# Patient Record
Sex: FEMALE | Race: WHITE | Employment: OTHER | URBAN - METROPOLITAN AREA
[De-identification: names, ages, dates, MRNs, and addresses within clinical notes are randomized per-mention and may not be internally consistent; named-entity substitution may affect disease eponyms.]

---

## 2020-07-27 ENCOUNTER — OFFICE VISIT (OUTPATIENT)
Dept: FAMILY MEDICINE | Facility: CLINIC | Age: 70
End: 2020-07-27
Payer: MEDICARE

## 2020-07-27 VITALS
DIASTOLIC BLOOD PRESSURE: 72 MMHG | OXYGEN SATURATION: 94 % | BODY MASS INDEX: 25.55 KG/M2 | HEART RATE: 82 BPM | TEMPERATURE: 98.6 F | HEIGHT: 72 IN | WEIGHT: 188.6 LBS | RESPIRATION RATE: 18 BRPM | SYSTOLIC BLOOD PRESSURE: 108 MMHG

## 2020-07-27 DIAGNOSIS — M99.9 NONALLOPATHIC LESION OF SACRAL REGION: ICD-10-CM

## 2020-07-27 DIAGNOSIS — M99.9 NONALLOPATHIC LESION OF LOWER EXTREMITIES: ICD-10-CM

## 2020-07-27 DIAGNOSIS — M99.05 SOMATIC DYSFUNCTION OF PELVIC REGION: ICD-10-CM

## 2020-07-27 DIAGNOSIS — M99.9 NONALLOPATHIC LESION OF LUMBAR REGION: ICD-10-CM

## 2020-07-27 RX ORDER — CITALOPRAM HYDROBROMIDE 20 MG/1
20 TABLET ORAL
COMMUNITY
Start: 2019-10-08

## 2020-07-27 RX ORDER — ATORVASTATIN CALCIUM 20 MG/1
20 TABLET, FILM COATED ORAL
COMMUNITY
Start: 2019-10-08 | End: 2020-10-07

## 2020-07-27 RX ORDER — GABAPENTIN 300 MG/1
300 CAPSULE ORAL
COMMUNITY
Start: 2020-06-11

## 2020-07-27 RX ORDER — CHOLECALCIFEROL (VITAMIN D3) 25 MCG
TABLET ORAL
COMMUNITY
Start: 2020-06-19

## 2020-07-27 RX ORDER — FAMOTIDINE 40 MG/1
TABLET, FILM COATED ORAL
COMMUNITY
Start: 2020-07-01

## 2020-07-27 RX ORDER — PROPYLTHIOURACIL 50 MG/1
50 TABLET ORAL
COMMUNITY
Start: 2020-04-30

## 2020-07-27 ASSESSMENT — MIFFLIN-ST. JEOR: SCORE: 1516.29

## 2020-07-27 NOTE — PROGRESS NOTES
Preceptor Attestation:    Patient seen and evaluated in person. I discussed the patient with the resident. I have verified the content of the note, which accurately reflects my assessment of the patient and the plan of care.   Supervising Physician:  Lance Almaraz MD.

## 2020-07-27 NOTE — PROGRESS NOTES
OMT Visit    Vicki Hall is a 70 year old year old female who is being seen today for OMT.  Patient states that she heard about me through her sister who was treated for chest discomfort.  Patient comes with low back pain.  She states she has had the pain for years.  Patient states it comes and goes but it is been more constant recently.  Patient states it is worse with standing and movement.  It is better with sleeping and sitting.  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  Patient denies any numbness, sharp shooting pains, or paresthesias down the legs.  Patient has tried PT in the past.  She completed it but does not do her exercises on a daily basis.  Patient has not tried  chiropractic therapy.    Chief Complaint: Patient presents with:  Back Pain: lower back pain and went to therapy is not helping      History reviewed. No pertinent past medical history.    Social History     Socioeconomic History     Marital status: Single     Spouse name: None     Number of children: None     Years of education: None     Highest education level: None   Occupational History     None   Social Needs     Financial resource strain: None     Food insecurity     Worry: None     Inability: None     Transportation needs     Medical: None     Non-medical: None   Tobacco Use     Smoking status: Current Every Day Smoker     Packs/day: 1.00     Smokeless tobacco: Never Used   Substance and Sexual Activity     Alcohol use: None     Drug use: None     Sexual activity: None   Lifestyle     Physical activity     Days per week: None     Minutes per session: None     Stress: None   Relationships     Social connections     Talks on phone: None     Gets together: None     Attends [employer] service: None     Active member of club or organization: None     Attends meetings of clubs or organizations: None     Relationship status: None     Intimate partner violence     Fear of current or ex partner: None      Emotionally abused: None     Physically abused: None     Forced sexual activity: None   Other Topics Concern     None   Social History Narrative     None         Current Outpatient Medications:      atorvastatin (LIPITOR) 20 MG tablet, Take 20 mg by mouth, Disp: , Rfl:      citalopram (CELEXA) 20 MG tablet, Take 20 mg by mouth, Disp: , Rfl:      famotidine (PEPCID) 40 MG tablet, , Disp: , Rfl:      gabapentin (NEURONTIN) 300 MG capsule, Take 300 mg by mouth, Disp: , Rfl:      propylthiouracil (PTU) 50 MG tablet, Take 50 mg by mouth, Disp: , Rfl:      VITAMIN D3 25 MCG (1000 UT) tablet, TK 1 T PO QD, Disp: , Rfl:     Exam    General: alert, pleasant and interactive  Psych: positive mood and affect and well-groomed  Thoracic Dysfunction: Paraspinal Muscle: ropiness  Lumbar Dysfunction: L3-L5: Sidebent left and Rotated right, tight paraspinal muscles, no tenderness over lumbosacral ligaments  Pelvic Dysfunction: left and anterior innominate, tenderness located over SI joints bilaterally, no tenderness over piriformis muscle  Sacrum Dysfunction: Torsion: left on right    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  Patient opted to try this today.  Performed  Soft tissue, muscle energy: direct, Myofascial: direct and articulatory techniques  on findings above.     Patient noted mild improvement in pain with manipulation.  Advised that she may feel sore for the next few days.  Encouraged her to drink lots of fluids.  Advised her to continue doing her physical therapy exercises at least 3 days a week.  Encouraged her to follow-up in 4 weeks for reevaluation and OMM.    I discussed the patient with Dr. Lance Almaraz who is in agreement with the assessment and plan.     Maeve Savage DO  Family Medicine Resident